# Patient Record
Sex: MALE | Race: WHITE | Employment: FULL TIME | URBAN - METROPOLITAN AREA
[De-identification: names, ages, dates, MRNs, and addresses within clinical notes are randomized per-mention and may not be internally consistent; named-entity substitution may affect disease eponyms.]

---

## 2021-06-20 ENCOUNTER — ANCILLARY PROCEDURE (OUTPATIENT)
Dept: GENERAL RADIOLOGY | Facility: CLINIC | Age: 26
End: 2021-06-20
Attending: INTERNAL MEDICINE
Payer: COMMERCIAL

## 2021-06-20 ENCOUNTER — OFFICE VISIT (OUTPATIENT)
Dept: URGENT CARE | Facility: URGENT CARE | Age: 26
End: 2021-06-20
Payer: COMMERCIAL

## 2021-06-20 VITALS
WEIGHT: 225 LBS | SYSTOLIC BLOOD PRESSURE: 138 MMHG | DIASTOLIC BLOOD PRESSURE: 82 MMHG | TEMPERATURE: 98.2 F | OXYGEN SATURATION: 100 % | HEART RATE: 67 BPM

## 2021-06-20 DIAGNOSIS — S29.9XXA INJURY OF CHEST WALL, INITIAL ENCOUNTER: Primary | ICD-10-CM

## 2021-06-20 DIAGNOSIS — R06.02 SOB (SHORTNESS OF BREATH): ICD-10-CM

## 2021-06-20 DIAGNOSIS — S29.9XXA INJURY OF CHEST WALL, INITIAL ENCOUNTER: ICD-10-CM

## 2021-06-20 DIAGNOSIS — R07.1 CHEST PAIN ON BREATHING: ICD-10-CM

## 2021-06-20 DIAGNOSIS — R07.9 RIGHT-SIDED CHEST PAIN: ICD-10-CM

## 2021-06-20 DIAGNOSIS — S20.211A CHEST WALL CONTUSION, RIGHT, INITIAL ENCOUNTER: ICD-10-CM

## 2021-06-20 PROCEDURE — 71101 X-RAY EXAM UNILAT RIBS/CHEST: CPT | Mod: RT | Performed by: RADIOLOGY

## 2021-06-20 PROCEDURE — 99204 OFFICE O/P NEW MOD 45 MIN: CPT | Performed by: INTERNAL MEDICINE

## 2021-06-20 RX ORDER — NAPROXEN 500 MG/1
500 TABLET ORAL 2 TIMES DAILY WITH MEALS
Qty: 30 TABLET | Refills: 0 | Status: SHIPPED | OUTPATIENT
Start: 2021-06-20 | End: 2021-07-05

## 2021-06-20 NOTE — LETTER
[facility] [place] [address] [place] [address]  Phone: [phone]    June 20, 2021        Red Valdez  [address] [facility] [address]          To whom it may concern:    RE: Red Valdez    Patient was seen and treated today at our clinic for rib injury.  Patient may return to work 6/24/2021   May need ongoing lifting restrictions.    Please contact me for questions or concerns.      Sincerely,        Candie Hernandez MD

## 2021-06-21 NOTE — PROGRESS NOTES
ASSESSMENT AND PLAN:      ICD-10-CM    1. Injury of chest wall, initial encounter  S29.9XXA XR Ribs & Chest Right G/E 3 Views     Miscellaneous Order for DME - ONLY FOR DME     naproxen (NAPROSYN) 500 MG tablet   2. SOB (shortness of breath)  R06.02 XR Ribs & Chest Right G/E 3 Views     Miscellaneous Order for DME - ONLY FOR DME     naproxen (NAPROSYN) 500 MG tablet   3. Right-sided chest pain  R07.9 XR Ribs & Chest Right G/E 3 Views     Miscellaneous Order for DME - ONLY FOR DME     naproxen (NAPROSYN) 500 MG tablet   4. Chest pain on breathing  R07.1 Miscellaneous Order for DME - ONLY FOR DME     naproxen (NAPROSYN) 500 MG tablet   5. Chest wall contusion, right, initial encounter  S20.211A Miscellaneous Order for DME - ONLY FOR DME     naproxen (NAPROSYN) 500 MG tablet       Differential Diagnosis:  MS Injury Pain: fracture and contusion  Serious Comorbid Conditions:  Adult:  None    PLAN:      Patient Instructions   Rib belt  Naprosyn 2 x day with food  Ice    Xray - no pneumothorax, no rib fracture     Recheck with primary as needed.      Patient Education     Rib Contusion or Minor Fracture    A rib contusion is a bruise to one or more rib bones. It may cause pain, tenderness, swelling, and a purplish color to the skin. There may be a sharp pain with each breath. A rib contusion takes anywhere from a few days to a few weeks to heal. A minor rib fracture or break may cause the same symptoms as a rib contusion. The small crack may not be seen on a regular chest X-ray. Treatment for both problems is basically the same.  Home care    You may use over-the-counter pain medicine to control pain, unless another pain medicine was prescribed. If you have chronic liver or kidney disease or ever had a stomach ulcer or GI (gastrointestinal) bleeding, talk with your healthcare provider before using these medicines.    Rest. Don't lift anything heavy or do any activity that causes pain.    Apply an ice pack over the injured  area for 15 to 20 minutes every 1 to 2 hours. You should do this for the first 24 to 48 hours. To make an ice pack, put ice cubes in a plastic bag that seals at the top. Wrap the bag in a clean, thin towel or cloth. Never put ice or an ice pack directly on the skin. Continue with ice packs as needed for the relief of pain and swelling.    The first 3 to 4 weeks of healing will be the most painful. If your pain is not under control with the treatment given, call your healthcare provider. Sometimes a stronger pain medicine may be needed. A nerve block can be done in case of severe pain. It will numb the nerve between the ribs.  Follow-up care  Follow up with your healthcare provider, or as advised.  If X-rays were taken, you will be told of any new findings that may affect your care.  Call 911  Call 911 if you have:    Dizziness, weakness or fainting    Shortness of breath with or without chest discomfort    New or worsening pain  When to seek medical advice  Call your healthcare provider right away if any of these occur:    Fever of 100.4 F (38 C) or higher, or as directed by your healthcare provider    Chills    Stomach pain, vomiting  [employer] last reviewed this educational content on 6/1/2018 2000-2021 [employer]. All rights reserved. This information is not intended as a substitute for professional medical care. Always follow your healthcare professional's instructions.             No follow-ups on file.        Candie Hernandez MD  [facility]    Subjective     Red Valdez is a 26 year old who presents for Patient presents with:  Urgent Care  Rib Pain: c/o rib pain and SOB after a fall 5 days ago and today after a game    a new patient of [facility].    MS Injury/Pain    Onset of symptoms was 1 week(s) ago.  Location: right chest wall injury  Context:       The injury happened while playing softball      Mechanism: fell on fence catching ball with right arm  outstretched, landing on right side chest wall over fence  Worsened after softball tournament yesterday.    1 hour ago started having shortness of breath   Pain with deep breathing  Aggravating Factors: movement and breathing  Therapies to improve symptoms include: none  This is the first time this type of problem has occurred for this patient.         Objective    /82   Pulse 67   Temp 98.2  F (36.8  C) (Tympanic)   Wt 102.1 kg (225 lb)   SpO2 100%   Physical Exam  Vitals signs reviewed.   Constitutional:       General: He is in acute distress.      Appearance: He is not ill-appearing, toxic-appearing or diaphoretic.      Comments: Standing up straight uncomfortable due to pain with his left arm holding the right side of his anterior chest   Cardiovascular:      Rate and Rhythm: Normal rate and regular rhythm.      Pulses: Normal pulses.      Heart sounds: Normal heart sounds.   Pulmonary:      Effort: Pulmonary effort is normal.      Breath sounds: Normal breath sounds.      Comments: No crepitus palpated on chest wall  Chest:      Chest wall: Tenderness (Patient has right-sided anterior midline to axillary tenderness present over middle chest wall) present.   Neurological:      Mental Status: He is alert.   Psychiatric:      Comments: Appears anxious with pain            Xray - Reviewed and interpreted by me.  No evidence of pneumothorax.  No evidence of rib fractures.

## 2021-06-21 NOTE — PATIENT INSTRUCTIONS
Rib belt  Naprosyn 2 x day with food  Ice    Xray - no pneumothorax, no rib fracture     Recheck with primary as needed.      Patient Education     Rib Contusion or Minor Fracture    A rib contusion is a bruise to one or more rib bones. It may cause pain, tenderness, swelling, and a purplish color to the skin. There may be a sharp pain with each breath. A rib contusion takes anywhere from a few days to a few weeks to heal. A minor rib fracture or break may cause the same symptoms as a rib contusion. The small crack may not be seen on a regular chest X-ray. Treatment for both problems is basically the same.  Home care    You may use over-the-counter pain medicine to control pain, unless another pain medicine was prescribed. If you have chronic liver or kidney disease or ever had a stomach ulcer or GI (gastrointestinal) bleeding, talk with your healthcare provider before using these medicines.    Rest. Don't lift anything heavy or do any activity that causes pain.    Apply an ice pack over the injured area for 15 to 20 minutes every 1 to 2 hours. You should do this for the first 24 to 48 hours. To make an ice pack, put ice cubes in a plastic bag that seals at the top. Wrap the bag in a clean, thin towel or cloth. Never put ice or an ice pack directly on the skin. Continue with ice packs as needed for the relief of pain and swelling.    The first 3 to 4 weeks of healing will be the most painful. If your pain is not under control with the treatment given, call your healthcare provider. Sometimes a stronger pain medicine may be needed. A nerve block can be done in case of severe pain. It will numb the nerve between the ribs.  Follow-up care  Follow up with your healthcare provider, or as advised.  If X-rays were taken, you will be told of any new findings that may affect your care.  Call 911  Call 911 if you have:    Dizziness, weakness or fainting    Shortness of breath with or without chest discomfort    New or  worsening pain  When to seek medical advice  Call your healthcare provider right away if any of these occur:    Fever of 100.4 F (38 C) or higher, or as directed by your healthcare provider    Chills    Stomach pain, vomiting  [employer] last reviewed this educational content on 6/1/2018 2000-2021 [employer]. All rights reserved. This information is not intended as a substitute for professional medical care. Always follow your healthcare professional's instructions.

## 2021-08-22 ENCOUNTER — HEALTH MAINTENANCE LETTER (OUTPATIENT)
Age: 26
End: 2021-08-22

## 2021-10-17 ENCOUNTER — HEALTH MAINTENANCE LETTER (OUTPATIENT)
Age: 26
End: 2021-10-17

## 2022-10-03 ENCOUNTER — HEALTH MAINTENANCE LETTER (OUTPATIENT)
Age: 27
End: 2022-10-03

## 2023-10-21 ENCOUNTER — HEALTH MAINTENANCE LETTER (OUTPATIENT)
Age: 28
End: 2023-10-21